# Patient Record
Sex: MALE | Race: WHITE | NOT HISPANIC OR LATINO | URBAN - METROPOLITAN AREA
[De-identification: names, ages, dates, MRNs, and addresses within clinical notes are randomized per-mention and may not be internally consistent; named-entity substitution may affect disease eponyms.]

---

## 2023-03-21 ENCOUNTER — EMERGENCY (EMERGENCY)
Facility: HOSPITAL | Age: 58
LOS: 1 days | Discharge: HOME | End: 2023-03-21
Attending: EMERGENCY MEDICINE | Admitting: EMERGENCY MEDICINE
Payer: COMMERCIAL

## 2023-03-21 VITALS
HEART RATE: 95 BPM | SYSTOLIC BLOOD PRESSURE: 130 MMHG | DIASTOLIC BLOOD PRESSURE: 75 MMHG | OXYGEN SATURATION: 98 % | TEMPERATURE: 98 F | RESPIRATION RATE: 18 BRPM

## 2023-03-21 VITALS
RESPIRATION RATE: 16 BRPM | DIASTOLIC BLOOD PRESSURE: 78 MMHG | WEIGHT: 210.1 LBS | SYSTOLIC BLOOD PRESSURE: 125 MMHG | OXYGEN SATURATION: 98 % | HEIGHT: 69 IN | HEART RATE: 94 BPM | TEMPERATURE: 98 F

## 2023-03-21 PROCEDURE — 99284 EMERGENCY DEPT VISIT MOD MDM: CPT

## 2023-03-21 PROCEDURE — 73630 X-RAY EXAM OF FOOT: CPT | Mod: 26,RT

## 2023-03-21 PROCEDURE — 73630 X-RAY EXAM OF FOOT: CPT

## 2023-03-21 PROCEDURE — 99283 EMERGENCY DEPT VISIT LOW MDM: CPT

## 2023-03-21 NOTE — ED PROVIDER NOTE - PATIENT PORTAL LINK FT
You can access the FollowMyHealth Patient Portal offered by [facility] by registering at the following website: [email or web address]. By joining [facility]’s FollowMyHealth portal, you will also be able to view your health information using other applications (apps) compatible with our system.

## 2023-03-21 NOTE — ED ADULT NURSE NOTE - AS PAIN REST
Satisfactory
Crescentic Intermediate Repair Preamble Text (Leave Blank If You Do Not Want): Undermining was performed with blunt dissection.
2 (mild pain)

## 2023-03-21 NOTE — ED PROVIDER NOTE - CLINICAL SUMMARY MEDICAL DECISION MAKING FREE TEXT BOX
Patient complaining of approximately 3 weeks of right great toe pain status post injured when jamming his right great toe against a sofa.  Patient relates pain worse with ambulation.  Patient has not had x-rays yet.  Patient declining pain medication.    Plan x-ray and outpatient follow-up with podiatry  Differential including but not limited to fracture dislocation sprain

## 2023-03-21 NOTE — ED ADULT NURSE NOTE - OBJECTIVE STATEMENT
Pt came in for pain on the lateral side of the right foot ( great toe area) about 4 weeks now after he banged the corner of a couch. Pt reported that pain is frequent and worse with movement. Pt have not taken any x ray or pain medication.

## 2023-03-21 NOTE — ED ADULT TRIAGE NOTE - CHIEF COMPLAINT QUOTE
" About 4 weeks ago , I banged the corner of a table and hurt my right foot ( great toe area ) " No pain medication taken " About 4 weeks ago , I banged the corner of a couch and hurt my right foot ( great toe area ) " No pain medication taken

## 2023-03-21 NOTE — ED ADULT NURSE NOTE - CHIEF COMPLAINT QUOTE
" About 4 weeks ago , I banged the corner of a couch and hurt my right foot ( great toe area ) " No pain medication taken

## 2023-03-21 NOTE — ED PROVIDER NOTE - NSFOLLOWUPINSTRUCTIONS_ED_ALL_ED_FT
Follow up with podiatry  return to er for any worsening symptoms     Foot Pain      Many things can cause foot pain. Some common causes are:  •An injury.      •A sprain.      •Arthritis.      •Blisters.      •Bunions.        Follow these instructions at home:      Managing pain, stiffness, and swelling   Bag of ice on a towel on the skin.   If directed, put ice on the painful area:  •Put ice in a plastic bag.      •Place a towel between your skin and the bag.      •Leave the ice on for 20 minutes, 2–3 times a day.      Activity     • Do not stand or walk for long periods.      •Return to your normal activities as told by your health care provider. Ask your health care provider what activities are safe for you.      •Do stretches to relieve foot pain and stiffness as told by your health care provider.      • Do not lift anything that is heavier than 10 lb (4.5 kg), or the limit that you are told, until your health care provider says that it is safe. Lifting a lot of weight can put added pressure on your feet.      Lifestyle     •Wear comfortable, supportive shoes that fit you well. Do not wear high heels.      •Keep your feet clean and dry.      General instructions     •Take over-the-counter and prescription medicines only as told by your health care provider.      •Rub your foot gently.      •Pay attention to any changes in your symptoms.      •Keep all follow-up visits as told by your health care provider. This is important.        Contact a health care provider if:    •Your pain does not get better after a few days of self-care.      •Your pain gets worse.      •You cannot stand on your foot.        Get help right away if:    •Your foot is numb or tingling.      •Your foot or toes are swollen.      •Your foot or toes turn white or blue.      •You have warmth and redness along your foot.        Summary    •Common causes of foot pain are injury, sprain, arthritis, blisters, or bunions.      •Ice, medicines, and comfortable shoes may help foot pain.      •Contact your health care provider if your pain does not get better after a few days of self-care.      This information is not intended to replace advice given to you by your health care provider. Make sure you discuss any questions you have with your health care provider.      Document Revised: 03/23/2022 Document Reviewed: 03/23/2022    [employer] Patient Education © 2023 [employer]

## 2023-03-21 NOTE — ED PROVIDER NOTE - NS ED ATTENDING STATEMENT MOD
This was a shared visit with the [facility]. I reviewed and verified the documentation and independently performed the documented:

## 2023-03-21 NOTE — ED PROVIDER NOTE - OBJECTIVE STATEMENT
Patient is a 58-year-old male here for right great toe injury sustained about 3 weeks ago when he jammed his foot into sofa.  Patient states he has been applying ace wrap with some relief.  Patient states able to walk and has shooting pain when he walks in the great toe.  Patient denies numbness tingling weakness.